# Patient Record
(demographics unavailable — no encounter records)

---

## 2024-11-13 NOTE — END OF VISIT
[FreeTextEntry3] : I have spent 45 minutes of time on the encounter which excludes teaching and separately reported services.

## 2024-11-13 NOTE — HISTORY OF PRESENT ILLNESS
[FreeTextEntry1] : 16-year-old male who presents today with mother for initial evaluation of L knee pain. He states that he injured his left knee last year and resolved spontaneously. His left knee pain again started 1 month before. There is no known trauma or injury prior that. He states that his pain worsens after long distance walking and running. He localizes his pain anterior aspects of bilateral knee. Denies any swelling. Mother denies any obvious limp, swelling, redness. He is not taking any pain medication. Denies any radiating pain or tingling sensation. Denies any weakness to lower extremity. He is able to do all physical activities without any discomfort or pain. He is here today for initial orthopedic evaluation.

## 2024-11-13 NOTE — ASSESSMENT
[FreeTextEntry1] : 16-year- old male with left knee injury with ACL tear and medial and lateral meniscus tears.  Today's assessment was performed with the assistance of the patient's parent as an independent historian given the patient's age, who could not be considered a reliable history/due to the nonverbal nature given the patient's young age. Clinical findings and MRI results were reviewed at length with the patient and parent. The condition, natural history, and prognosis were explained to the patient and family. Based on his examination and radiographs I had an extensive discussion with patient and his mother regarding ACL reconstruction. Reviewed the fact that this is heavily favored to allow for stability of the knee to return to recreational sport. I did review graft options including quadriceps as well as patellar tendon and briefly discussed lateral extra-articular tenodesis, which is a newer phenomenon. Based on the patella andrew that is present and the presence of a bone ossicle at the inferior pole of the patella, I would favor quad autograft.  I have reviewed risks, benefits, and alternatives. I discussed the biggest complication, which would be stiffness and re-tear of the ACL graft, which is two times the rate in adults. I discussed the fact that the most important factor to re-tear is early return to sport, which is defined as nine months or sooner and also reviewed my protocol, which would involve a return to play test prior to return to see if he has adequate strength and capabilities to return to sport to minimize the chances of a re-tear. At this point, the family wants to proceed with surgery. Our office will reach out to discuss potential dates. All questions were answered to satisfaction today. Mother expressed understanding and agree.  I, Minerva White, have acted as a scribe and documented the above information for Dr. Benito The above documentation completed by the scribe is an accurate record of both my words and actions.  VALENTÍND

## 2024-11-13 NOTE — PHYSICAL EXAM
[FreeTextEntry1] : Gait: Presents ambulating independently without signs of antalgia. Good coordination and balance noted. GENERAL: alert, cooperative, in NAD SKIN: The skin is intact, warm, pink and dry over the area examined. EYES: Normal conjunctiva, normal eyelids and pupils were equal and round. ENT: normal ears, normal nose and normal lips. CARDIOVASCULAR: brisk capillary refill, but no peripheral edema. RESPIRATORY: The patient is in no apparent respiratory distress. They're taking full deep breaths without use of accessory muscles or evidence of audible wheezes or stridor without the use of a stethoscope. Normal respiratory effort. ABDOMEN: not examined  Left knee Skin intact Mild effusion present. Full extension of the knee. No joint line tenderness. Positive anterior drawer test. Positive Lachman. Stable to varus/valgus stress
[FreeTextEntry1] : Gait: Presents ambulating independently without signs of antalgia. Good coordination and balance noted. GENERAL: alert, cooperative, in NAD SKIN: The skin is intact, warm, pink and dry over the area examined. EYES: Normal conjunctiva, normal eyelids and pupils were equal and round. ENT: normal ears, normal nose and normal lips. CARDIOVASCULAR: brisk capillary refill, but no peripheral edema. RESPIRATORY: The patient is in no apparent respiratory distress. They're taking full deep breaths without use of accessory muscles or evidence of audible wheezes or stridor without the use of a stethoscope. Normal respiratory effort. ABDOMEN: not examined  Left knee Skin intact Mild effusion present. Full extension of the knee. No joint line tenderness. Positive anterior drawer test. Positive Lachman. Stable to varus/valgus stress
Principal Discharge DX:	Diverticulitis of large intestine with abscess, unspecified bleeding status

## 2024-11-13 NOTE — DATA REVIEWED
[de-identified] : MRI of left knee were obtained from [facility] on 10/22/2024 and independently reviewed today 1. Complex tearing of both the medial and lateral meniscus with displaced flaps of meniscal tissue. 2. Lateral tibiofemoral compartment arthrosis with scattered segments of full thickness fissuring and cartilage thinning 2. Scattered segments of full thickness cartilage loss along the trochlear facets, large joint effusion 3. Lateralization of the patella in the relationship to the central trochlear groove distance of 2.5 cm. 4. Patella Erika, ossicle noted inferior pole of patella, ACL tear.

## 2024-11-13 NOTE — DATA REVIEWED
[de-identified] : MRI of left knee were obtained from [facility] on 10/22/2024 and independently reviewed today 1. Complex tearing of both the medial and lateral meniscus with displaced flaps of meniscal tissue. 2. Lateral tibiofemoral compartment arthrosis with scattered segments of full thickness fissuring and cartilage thinning 2. Scattered segments of full thickness cartilage loss along the trochlear facets, large joint effusion 3. Lateralization of the patella in the relationship to the central trochlear groove distance of 2.5 cm. 4. Patella Erika, ossicle noted inferior pole of patella, ACL tear.

## 2025-01-10 NOTE — POST OP
[0] : no pain reported [Healed] : healed [Doing Well] : is doing well [Excellent Pain Control] : has excellent pain control [No Sign of Infection] : is showing no signs of infection [No Sports] : not to participate in sports [Fever] : no fever [de-identified] : 12/30/24: Left knee arthroscopically assisted ACL recon with quadriceps autograft, partial medial meniscectomy, as well as lateral femoral condylar chondroplasty.  [de-identified] : 17 yo male s/p above. He is doing well. He denies pain at this time. He is taking an aspirin a day. Occasional tylenol/ibuprofen for pain. He denies fever or chills. He has been using crutches NWB and knee immobilizer.  [de-identified] : Incisions healing well. No signs of infection. +ecchymosis tracking down tibia region. Compartments soft.  Full extension.  no calf tenderness distal motor 5/5 sensation grossly intact brisk cap refill  [de-identified] : 3 views of the left knee today in the office reveal the hardware in place. Good overall alignment of the tunnels.  [de-identified] : He was transitioned today to a [place] brace 0-30 advancing as tolerated. He may start WBAT with the assistance of the brace and crutches. Rx for PT following ACL protocol given to family.  He may shower. He may attend school, no gym or sports, elevator access, extra time He will f/u in 4 weeks for clincal reevaluation.  Continue ASA for an additional 3 weeks until he is more ambulatory.  All questions answered. Parent in agreement with the plan. IJulia, MPAS, PAC, have acted as a scribe and documented the above for Dr. Valdes.,  The above documentation completed by the scribe is an accurate record of both my words and actions.  DEVON

## 2025-02-05 NOTE — POST OP
[0] : no pain reported [Healed] : healed [Doing Well] : is doing well [Excellent Pain Control] : has excellent pain control [No Sign of Infection] : is showing no signs of infection [No Sports] : not to participate in sports [Fever] : no fever [de-identified] : 12/30/24: Left knee arthroscopically assisted ACL recon with quadriceps autograft, partial medial meniscectomy, as well as lateral femoral condylar chondroplasty.  [de-identified] : 17 yo male s/p above. He is doing well. He denies pain at this time. He is taking an aspirin a day. No pain meds at this time. . He denies fever or chills. He is using the alexi brace and undergoing PT. He c/o some swelling.  [de-identified] : Incisions healing well. No signs of infection.+moderate effusion present Full extension. Flexion 120 degrees. No tenderness to palpation. No joint line tenderness Lachman stable and Anterior drawer stable.  no calf tenderness distal motor 5/5 sensation grossly intact brisk cap refill He is able to SLR without lag.   [de-identified] : none today [de-identified] : He is doing well as far as pain. He still have an effusion present. His surgery and findings of early DJD was discussed with parent and patient. He has sufficient strength to discontinue the alexi brace. He will continue PT working on strength. He will f/u in 8 weeks to see how he is doing.  No gym or sports stressed to the patient.  All questions answered. Parent in agreement with the plan. IJulia, MPAS, PAC, have acted as a scribe and documented the above for Dr. Valdes.  The above documentation completed by the scribe is an accurate record of both my words and actions.  DEVON

## 2025-04-09 NOTE — REVIEW OF SYSTEMS
[Rash] : no rash [Congestion] : no congestion [Feeding Problem] : no feeding problem [Joint Pains] : no arthralgias [Joint Swelling] : no joint swelling

## 2025-04-09 NOTE — HISTORY OF PRESENT ILLNESS
[FreeTextEntry1] : 17-year-old male status post left knee ACL reconstruction with quadriceps autograft, partial medial meniscectomy and lateral femoral condylar chondroplasty presents today for routine follow-up.  He has been undergoing physical therapy and doing well.  He denies any pain at this time he denies any swelling.  He is no longer wearing any brace.  He is here for evaluation.  He has been compliant with activity restriction.

## 2025-04-09 NOTE — ASSESSMENT
[FreeTextEntry1] : Left knee ACL reconstruction, partial medial meniscectomy and lateral femoral condylar chondroplasty.   The history for today's visit was obtained from the child, as well as the parent. The child's history was unreliable alone due to age and therefore, the parent was used today as an independent historian. He is doing well post op. He will continue with the ACL protocol at this time concentrating on strengthening. Early DJD discussed with parents and patient again. The effusion was discussed and most likely due to the meniscal injury present. The swelling is decreased but still has some effusion noted. He will fu in 2 months and if swelling still present, MRI will be ordered to evaluate the meniscus and the cartilege injury.  Activity restriction for 9 months post op again discussed.  All questions answered. Parent in agreement with the plan. IJulia, SUSANA, PAC, have acted as a scribe and documented the above for Dr. Valdes.  The above documentation completed by the scribe is an accurate record of both my words and actions.  DEVON

## 2025-04-09 NOTE — REASON FOR VISIT
[FreeTextEntry1] : 12/30/24: left knee ACL recon with quad autograft, partial medial meniscectomy and lateral femoral condylar chondroplasty

## 2025-07-16 NOTE — REASON FOR VISIT
[Follow Up] : a follow up visit [Patient] : patient [Parents] : parents [FreeTextEntry1] : 12/30/24: left knee ACL recon with quad autograft, partial medial meniscectomy and lateral femoral condylar chondroplasty

## 2025-07-16 NOTE — PHYSICAL EXAM
[FreeTextEntry1] : GAIT: No limp. Good coordination and balance noted. GENERAL: alert, cooperative pleasant young 16 yo male in NAD SKIN: The skin is intact, warm, pink and dry over the area examined. EYES: Normal conjunctiva, normal eyelids and pupils were equal and round. ENT: normal ears, normal nose and normal lips. CARDIOVASCULAR: brisk capillary refill, but no peripheral edema. RESPIRATORY: The patient is in no apparent respiratory distress. They're taking full deep breaths without use of accessory muscles or evidence of audible wheezes or stridor without the use of a stethoscope. Normal respiratory effort. ABDOMEN: not examined   Hips: full symmetrical ROM without tenderness elicited.  left Knee: mild effusion noted. No STS, erythema or warmth noted. Able to SLR without lag. Full range of motion of the knee. incisions healed, keloid present.  No instability to varus/valgus stress.   Negative Eli's, Lachman and Anterior/posterior drawer with firm endpoint. No joint line tenderness. Negative patella apprehension. Negative patella grind test. Negative patella J-sign. No calf tenderness ankle: full ROM without instability to stress. No tenderness or STS.  Distal motor 5/5 sensation grossly intact brisk cap refill

## 2025-07-16 NOTE — REVIEW OF SYSTEMS
[Change in Activity] : change in activity [Rash] : no rash [Congestion] : no congestion [Feeding Problem] : no feeding problem [Joint Pains] : no arthralgias [Joint Swelling] : no joint swelling

## 2025-07-16 NOTE — ASSESSMENT
[FreeTextEntry1] : Left knee ACL reconstruction, partial medial meniscectomy and lateral femoral condylar chondroplasty.   The history for today's visit was obtained from the child, as well as the parent. The child's history was unreliable alone due to age and therefore, the parent was used today as an independent historian. He is doing well post op. He will continue with the ACL protocol at this time concentrating on strengthening. Early DJD discussed with parents and patient again. The effusion was discussed and most likely due to the meniscal injury present. The swelling is decreased but still has some effusion noted. He will fu in 2 months to see how he is doing, after undergoing a RTP test to see if activity clearance is possible/recommended.  Activity restriction for 9 months post op again discussed. Note provided for school.  If there is further knee joint effusion, possible repeat MRI may be indicated.  All questions answered. Parent in agreement with the plan. Julia MCDERMOTT, SUSANA, PAC, have acted as a scribe and documented the above for Dr. Valdes.  The above documentation completed by the scribe is an accurate record of both my words and actions.  DEVON

## 2025-07-16 NOTE — HISTORY OF PRESENT ILLNESS
[0] : currently ~his/her~ pain is 0 out of 10 [FreeTextEntry1] : 17-year-old male status post left knee ACL reconstruction with quadriceps autograft, partial medial meniscectomy and lateral femoral condylar chondroplasty presents today for routine follow-up.  He has been undergoing physical therapy and doing well.  He denies any pain at this time and states the swelling has improved. He has been compliant with activity restriction.

## 2025-07-16 NOTE — PHYSICAL EXAM
[FreeTextEntry1] : GAIT: No limp. Good coordination and balance noted. GENERAL: alert, cooperative pleasant young 18 yo male in NAD SKIN: The skin is intact, warm, pink and dry over the area examined. EYES: Normal conjunctiva, normal eyelids and pupils were equal and round. ENT: normal ears, normal nose and normal lips. CARDIOVASCULAR: brisk capillary refill, but no peripheral edema. RESPIRATORY: The patient is in no apparent respiratory distress. They're taking full deep breaths without use of accessory muscles or evidence of audible wheezes or stridor without the use of a stethoscope. Normal respiratory effort. ABDOMEN: not examined   Hips: full symmetrical ROM without tenderness elicited.  left Knee: mild effusion noted. No STS, erythema or warmth noted. Able to SLR without lag. Full range of motion of the knee. incisions healed, keloid present.  No instability to varus/valgus stress.   Negative Eli's, Lachman and Anterior/posterior drawer with firm endpoint. No joint line tenderness. Negative patella apprehension. Negative patella grind test. Negative patella J-sign. No calf tenderness ankle: full ROM without instability to stress. No tenderness or STS.  Distal motor 5/5 sensation grossly intact brisk cap refill